# Patient Record
Sex: FEMALE | Race: WHITE
[De-identification: names, ages, dates, MRNs, and addresses within clinical notes are randomized per-mention and may not be internally consistent; named-entity substitution may affect disease eponyms.]

---

## 2018-06-26 ENCOUNTER — HOSPITAL ENCOUNTER (EMERGENCY)
Dept: HOSPITAL 18 - NAV ERS | Age: 28
Discharge: ANOTHER HOSPITAL | End: 2018-06-26
Payer: SELF-PAY

## 2018-06-26 ENCOUNTER — HOSPITAL ENCOUNTER (EMERGENCY)
Dept: HOSPITAL 92 - ERS | Age: 28
LOS: 1 days | Discharge: HOME | End: 2018-06-27
Payer: COMMERCIAL

## 2018-06-26 DIAGNOSIS — E87.6: ICD-10-CM

## 2018-06-26 DIAGNOSIS — O99.89: ICD-10-CM

## 2018-06-26 DIAGNOSIS — Z3A.16: ICD-10-CM

## 2018-06-26 DIAGNOSIS — O99.332: ICD-10-CM

## 2018-06-26 DIAGNOSIS — M79.605: ICD-10-CM

## 2018-06-26 DIAGNOSIS — T19.8XXA: ICD-10-CM

## 2018-06-26 DIAGNOSIS — O99.282: ICD-10-CM

## 2018-06-26 DIAGNOSIS — R10.9: ICD-10-CM

## 2018-06-26 DIAGNOSIS — O99.89: Primary | ICD-10-CM

## 2018-06-26 DIAGNOSIS — O9A.212: Primary | ICD-10-CM

## 2018-06-26 DIAGNOSIS — F17.210: ICD-10-CM

## 2018-06-26 DIAGNOSIS — M79.604: ICD-10-CM

## 2018-06-26 LAB
ALBUMIN SERPL BCG-MCNC: 3.6 G/DL (ref 3.5–5)
ALP SERPL-CCNC: 46 U/L (ref 40–150)
ALT SERPL W P-5'-P-CCNC: 13 U/L (ref 8–55)
ANION GAP SERPL CALC-SCNC: 12 MMOL/L (ref 10–20)
AST SERPL-CCNC: 18 U/L (ref 5–34)
BASOPHILS # BLD AUTO: 0.1 THOU/UL (ref 0–0.2)
BASOPHILS NFR BLD AUTO: 0.8 % (ref 0–1)
BILIRUB SERPL-MCNC: 0.3 MG/DL (ref 0.2–1.2)
BUN SERPL-MCNC: 5 MG/DL (ref 7–18.7)
CALCIUM SERPL-MCNC: 9.1 MG/DL (ref 7.8–10.44)
CHLORIDE SERPL-SCNC: 104 MMOL/L (ref 98–107)
CO2 SERPL-SCNC: 22 MMOL/L (ref 22–29)
CREAT CL PREDICTED SERPL C-G-VRATE: 0 ML/MIN (ref 70–130)
EOSINOPHIL # BLD AUTO: 0.4 THOU/UL (ref 0–0.7)
EOSINOPHIL NFR BLD AUTO: 3.8 % (ref 0–10)
GLOBULIN SER CALC-MCNC: 2.8 G/DL (ref 2.4–3.5)
GLUCOSE SERPL-MCNC: 96 MG/DL (ref 70–105)
HGB BLD-MCNC: 12.7 G/DL (ref 12–16)
LIPASE SERPL-CCNC: 10 U/L (ref 8–78)
LYMPHOCYTES # BLD AUTO: 2.5 THOU/UL (ref 1.2–3.4)
LYMPHOCYTES NFR BLD AUTO: 25.2 % (ref 21–51)
MCH RBC QN AUTO: 29.9 PG (ref 27–31)
MCV RBC AUTO: 90.7 FL (ref 78–98)
MONOCYTES # BLD AUTO: 0.7 THOU/UL (ref 0.11–0.59)
MONOCYTES NFR BLD AUTO: 7 % (ref 0–10)
NEUTROPHILS # BLD AUTO: 6.3 THOU/UL (ref 1.4–6.5)
NEUTROPHILS NFR BLD AUTO: 63.3 % (ref 42–75)
PLATELET # BLD AUTO: 192 THOU/UL (ref 130–400)
POTASSIUM SERPL-SCNC: 3.3 MMOL/L (ref 3.5–5.1)
RBC # BLD AUTO: 4.24 MILL/UL (ref 4.2–5.4)
RBC UR QL AUTO: (no result) HPF (ref 0–3)
SODIUM SERPL-SCNC: 135 MMOL/L (ref 136–145)
SP GR UR STRIP: 1.02 (ref 1–1.03)
WBC # BLD AUTO: 9.9 THOU/UL (ref 4.8–10.8)

## 2018-06-26 PROCEDURE — 76770 US EXAM ABDO BACK WALL COMP: CPT

## 2018-06-26 PROCEDURE — 83690 ASSAY OF LIPASE: CPT

## 2018-06-26 PROCEDURE — 80053 COMPREHEN METABOLIC PANEL: CPT

## 2018-06-26 PROCEDURE — 76805 OB US >/= 14 WKS SNGL FETUS: CPT

## 2018-06-26 PROCEDURE — 99284 EMERGENCY DEPT VISIT MOD MDM: CPT

## 2018-06-26 PROCEDURE — 81015 MICROSCOPIC EXAM OF URINE: CPT

## 2018-06-26 PROCEDURE — 85379 FIBRIN DEGRADATION QUANT: CPT

## 2018-06-26 PROCEDURE — 85025 COMPLETE CBC W/AUTO DIFF WBC: CPT

## 2018-06-26 PROCEDURE — 81003 URINALYSIS AUTO W/O SCOPE: CPT

## 2018-06-27 NOTE — ULT
PRELIMINARY REPORT/[facility]/EMERGENTY AFTER-HOURS PROCEDURE 

 

US Pregnancy After First Trimester, Transabdominal

 

CLINICAL HISTORY:

28 years old, female; Pain and condition or disease; Lmp or gestational age (weeks): 16w2d; Other: [place] iud in left fallopian tube; Pregnancy complicated by abdominal or pelvic pain; Other: Left flank
 pain; 3rd pregnancy; Pregnant

 

TECHNIQUE:

Real-time transabdominal obstetrical ultrasound of the maternal pelvis and a second or third trimeste
r pregnancy with image documentation.

 

COMPARISON:

No relevant prior studies available.

 

FINDINGS:

Single living fetus in breech position.

Anterior placenta.

No visible placental abnormality on the provided images.

Amniotic fluid volume within normal limits.

Cervical length was estimated with transabdominal scanning, measuring approximately 3.7 cm.

No definite cervical canal dilation or fluid on the provided images.

BPD: , 3.4 cm. , 16 weeks, 4 days

HC: , 12.9 cm. , 16 weeks, 4 days

AC: , 10.7 cm. , 16 weeks, 4 days

FL: , 2.2 cm. , 16 weeks, 3 days

Composite age: 16 weeks, 4 days.

Estimated weight: 160 grams.

Fetal heart activity documented by the technologist, 160 bpm.

Evaluation of fetal anatomy still limited by early gestation.

Followup of fetal anatomy later in pregnancy recommended, as clinically appropriate.

No visible maternal adnexal abnormality on the provided images. Neither maternal ovary was

definitely visualized.

The urinary bladder was not completely evaluated/imaged at this time.

 

IMPRESSION:

Single living fetus, composite age: 16 weeks, 4 days.

Anterior placenta.

No visible placental abnormality on the provided images.

Normal amniotic fluid volume.

Other details discussed above.

 

Thank you for allowing us to participate in the care of your patient.

Dictated and Authenticated by: Bud Tsang MD

06/27/2018 3:02 AM Central Time ([place] & [place])

 

 

OBSTETRIC SONOGRAM SECOND TRIMESTER:

 

Date: 6-27-18 

 

Performed on emergency basis at 0128 hours. 

 

History: Pelvic pain. Second trimester. 

 

FINDINGS: 

Agree with the preliminary report by Dr. Tsang from [facility]. Single viable intrauterine ges
tation with estimated gestational age of 16 weeks 4 days. Anterior placenta. No evidence of previa. N
o significant abnormalities are demonstrated. 

 

Code QA

 

POS: [facility]

## 2018-06-27 NOTE — ULT
PRELIMINARY REPORT/[facility]/EMERGENTY AFTER-HOURS PROCEDURE 

 

US Retroperitoneal Complete

 

CLINICAL HISTORY:

28 years old, female; Pain; Abdominal pain; Flank; Left; Pregnant

 

TECHNIQUE:

Real-time ultrasound of the retroperitoneum (complete) with image documentation.

 

COMPARISON:

No relevant prior studies available.

 

FINDINGS:

The right kidney measures 11.4 cm in length.

The left kidney measures 12.9 cm in length.

There is no hydronephrosis or perinephric fluid.

Neither ureter is visible or dilated.

The renal parenchymal thickness and echogenicity are within normal limits.

There is no sonographically visible renal calculus, mass, or cyst.

The included images of the urinary bladder appear essentially unremarkable.

Bilateral ureteral jets identified.

 

IMPRESSION:

Essentially unremarkable sonographic appearance of the kidneys.

No hydronephrosis.

 

 

Thank you for allowing us to participate in the care of your patient.

Dictated and Authenticated by: Bud Tsang MD

06/27/2018 2:51 AM Central Time ([place] & [place])

 

 

FINAL REPORT 

 

RENAL SONOGRAM:

 

DATE: 6/27/18.

TIME: Performed on an emergency basis at 0135 hours.

 

HISTORY: 

Left flank pain.

 

FINDINGS: 

Findings agree with the preliminary report by Dr. Tsang from [facility].  No hydronephrosis or
 other findings acute urinary tract obstruction.

 

POS: [facility]

## 2020-09-26 ENCOUNTER — HOSPITAL ENCOUNTER (EMERGENCY)
Dept: HOSPITAL 18 - NAV ERS | Age: 30
LOS: 1 days | Discharge: ANOTHER HOSPITAL | End: 2020-09-27
Payer: SELF-PAY

## 2020-09-26 DIAGNOSIS — O99.89: Primary | ICD-10-CM

## 2020-09-26 DIAGNOSIS — F17.210: ICD-10-CM

## 2020-09-26 DIAGNOSIS — Z3A.39: ICD-10-CM

## 2020-09-26 DIAGNOSIS — O99.333: ICD-10-CM

## 2020-09-26 DIAGNOSIS — R10.30: ICD-10-CM

## 2020-09-26 LAB
ALBUMIN SERPL BCG-MCNC: 3.6 G/DL (ref 3.5–5)
ALP SERPL-CCNC: 213 U/L (ref 40–110)
ALT SERPL W P-5'-P-CCNC: 7 U/L (ref 8–55)
ANION GAP SERPL CALC-SCNC: 16 MMOL/L (ref 10–20)
AST SERPL-CCNC: 17 U/L (ref 5–34)
BASOPHILS # BLD AUTO: 0.1 THOU/UL (ref 0–0.2)
BASOPHILS NFR BLD AUTO: 0.7 % (ref 0–1)
BILIRUB SERPL-MCNC: 0.3 MG/DL (ref 0.2–1.2)
BUN SERPL-MCNC: 6 MG/DL (ref 7–18.7)
CALCIUM SERPL-MCNC: 9 MG/DL (ref 7.8–10.44)
CHLORIDE SERPL-SCNC: 104 MMOL/L (ref 98–107)
CO2 SERPL-SCNC: 21 MMOL/L (ref 22–29)
CREAT CL PREDICTED SERPL C-G-VRATE: 0 ML/MIN (ref 70–130)
EOSINOPHIL # BLD AUTO: 0.2 THOU/UL (ref 0–0.7)
EOSINOPHIL NFR BLD AUTO: 1.9 % (ref 0–10)
GLOBULIN SER CALC-MCNC: 3.5 G/DL (ref 2.4–3.5)
GLUCOSE SERPL-MCNC: 107 MG/DL (ref 70–105)
HGB BLD-MCNC: 11.3 G/DL (ref 12–16)
LYMPHOCYTES # BLD AUTO: 2.6 THOU/UL (ref 1.2–3.4)
LYMPHOCYTES NFR BLD AUTO: 20.7 % (ref 21–51)
MCH RBC QN AUTO: 29.9 PG (ref 27–31)
MCV RBC AUTO: 95.2 FL (ref 78–98)
MONOCYTES # BLD AUTO: 0.8 THOU/UL (ref 0.11–0.59)
MONOCYTES NFR BLD AUTO: 6 % (ref 0–10)
NEUTROPHILS # BLD AUTO: 9 THOU/UL (ref 1.4–6.5)
NEUTROPHILS NFR BLD AUTO: 70.7 % (ref 42–75)
PLATELET # BLD AUTO: 287 THOU/UL (ref 130–400)
POTASSIUM SERPL-SCNC: 3.6 MMOL/L (ref 3.5–5.1)
RBC # BLD AUTO: 3.77 MILL/UL (ref 4.2–5.4)
SODIUM SERPL-SCNC: 137 MMOL/L (ref 136–145)
WBC # BLD AUTO: 12.7 THOU/UL (ref 4.8–10.8)

## 2020-09-26 PROCEDURE — 80053 COMPREHEN METABOLIC PANEL: CPT

## 2020-09-26 PROCEDURE — 85025 COMPLETE CBC W/AUTO DIFF WBC: CPT

## 2020-09-26 PROCEDURE — 96360 HYDRATION IV INFUSION INIT: CPT

## 2020-09-26 PROCEDURE — 36415 COLL VENOUS BLD VENIPUNCTURE: CPT
